# Patient Record
Sex: MALE | Race: WHITE | Employment: FULL TIME | URBAN - METROPOLITAN AREA
[De-identification: names, ages, dates, MRNs, and addresses within clinical notes are randomized per-mention and may not be internally consistent; named-entity substitution may affect disease eponyms.]

---

## 2017-10-31 ENCOUNTER — OFFICE VISIT (OUTPATIENT)
Dept: DERMATOLOGY | Age: 57
End: 2017-10-31

## 2017-10-31 DIAGNOSIS — D48.5 NEOPLASM OF UNCERTAIN BEHAVIOR OF SKIN: ICD-10-CM

## 2017-10-31 DIAGNOSIS — D18.00 ANGIOMA: ICD-10-CM

## 2017-10-31 DIAGNOSIS — D22.9 MULTIPLE NEVI: Primary | ICD-10-CM

## 2017-10-31 PROCEDURE — 11100 PR BIOPSY OF SKIN LESION: CPT | Performed by: DERMATOLOGY

## 2017-10-31 PROCEDURE — 99202 OFFICE O/P NEW SF 15 MIN: CPT | Performed by: DERMATOLOGY

## 2017-10-31 NOTE — PROGRESS NOTES
[facility]  Dorothy Cerda MD  [phone]      Rubia Vizcaino  1960    61 y.o. male     Date of Visit: 10/31/2017    Last seen: new; wife is a patient    Referred by: No ref. provider found    Chief Complaint: moles/lesions  Chief Complaint   Patient presents with    Skin Exam     NP-no history of skin cancer      History of Present Illness:    1. Here for evaluation of multiple asx pigmented lesions on the trunk and extremities, present for many years; no change in size/shape/color of any lesions; no bleeding lesions. 2. He has a concerning lesion on the R side of the neck that has been persistent x 3 mos. Asx currently but was slightly tender. Denies personal history of skin cancer. No family history of skin cancer. He avoids the sun, wears sunscreen if outside. No tanning bed use. Doesn't tan easily. Had a lot of sun as a child. Review of Systems:  Gen: Feels well, good sense of health. Skin: No changing moles or lesions. No new rashes. Past Medical History, Family History, Surgical History, Medications and Allergies reviewed. No outpatient prescriptions have been marked as taking for the 10/31/17 encounter (Office Visit) with Maylin Valera MD.     No Known Allergies    History reviewed. No pertinent past medical history. History reviewed. No pertinent surgical history. Physical Examination     Gen, well-appearing  The following were examined and determined to be normal: Psych/Neuro, Scalp/hair, Head/face, Conjunctivae/eyelids, Gums/teeth/lips, Breast/axilla/chest, Abdomen, Back, RUE, LUE, RLE, LLE, Nails/digits and buttocks. The following were examined and determined to be abnormal: Neck. trunk and extremities with scattered brown and red macules and papules   L chest with purplish red papule  R neck with shiny pink scabbed ~4 mm macule    Assessment and Plan     1.  Benign-appearing nevi and angiomas  - educ re ABCD's of MM   educ sun

## 2017-10-31 NOTE — PATIENT INSTRUCTIONS
you will do instead of smoking. Tell your family and friends that you are going to try to quit and on what date. Put together a list of phone numbers of friends and family members who can give you support when you feel you might break down and have a cigarette. Before your quit day, put all tobacco products, ashtrays, and lighters away. 3. Put your plan into action  When you wake up on your quit day, start using a nicotine replacement method if you had planned to do that. For the first few days after you quit, you may have some signs of nicotine withdrawal. You may feel restless and cranky. You may find it hard to think. You might need to change your dose of nicotine if these signs upset you. Do not smoke! If you feel like you want to smoke, call a friend or family member who has agreed to help you. Also, there might be someone in your doctor's office you can call. Put into action your plans for doing things other than smoking. For example, when you feel the urge to smoke, you might take a walk. Or, you might visit friends who do not smoke. It is best to stay away from places where you used to smoke. 4. If you return to smoking  Quitting smoking is not easy. Many people have to try several times before they succeed. If you start smoking again, call your primary care doctor's office soon to talk about what happened. Think about what you can do to keep from smoking when you try to quit again. Part of this handout is provided by the [employer]. More information is available at the American Lung Association https://moore.com/.  This information provides a general overview and may not apply to everyone. Talk to your primary care doctor to find out if this information applies to you and to get more information on this subject. Biopsy Wound Care Instructions    · Keep the bandage in place for 24 hours.    · Cleanse the wound with mild soapy water

## 2017-11-07 ENCOUNTER — TELEPHONE (OUTPATIENT)
Dept: DERMATOLOGY | Age: 57
End: 2017-11-07

## 2017-11-07 NOTE — TELEPHONE ENCOUNTER
Pt wife melchor calling to get her  biopsy results pls call back to discuss @ [phone] thank you I tried to attach to first message

## 2018-02-20 ENCOUNTER — OFFICE VISIT (OUTPATIENT)
Dept: INTERNAL MEDICINE CLINIC | Age: 58
End: 2018-02-20

## 2018-02-20 VITALS
DIASTOLIC BLOOD PRESSURE: 70 MMHG | SYSTOLIC BLOOD PRESSURE: 130 MMHG | HEIGHT: 70 IN | TEMPERATURE: 98.6 F | HEART RATE: 102 BPM | WEIGHT: 184.6 LBS | BODY MASS INDEX: 26.43 KG/M2

## 2018-02-20 DIAGNOSIS — Z76.89 ENCOUNTER TO ESTABLISH CARE: ICD-10-CM

## 2018-02-20 DIAGNOSIS — Z72.0 TOBACCO ABUSE: Primary | ICD-10-CM

## 2018-02-20 PROCEDURE — 99203 OFFICE O/P NEW LOW 30 MIN: CPT | Performed by: INTERNAL MEDICINE

## 2018-02-20 RX ORDER — VARENICLINE TARTRATE 0.5 MG/1
0.5 TABLET, FILM COATED ORAL 2 TIMES DAILY
COMMUNITY
End: 2020-01-08 | Stop reason: SINTOL

## 2018-02-20 ASSESSMENT — PATIENT HEALTH QUESTIONNAIRE - PHQ9
SUM OF ALL RESPONSES TO PHQ QUESTIONS 1-9: 0
2. FEELING DOWN, DEPRESSED OR HOPELESS: 0
1. LITTLE INTEREST OR PLEASURE IN DOING THINGS: 0
SUM OF ALL RESPONSES TO PHQ9 QUESTIONS 1 & 2: 0

## 2018-02-20 NOTE — PATIENT INSTRUCTIONS
live longer.)  What are the risks of screening? CT screening for lung cancer isn't perfect. It can show an abnormal result when it turns out there was not any cancer. This is called a false-positive result. This means you may need more tests to make sure you don't have cancer. These tests can be harmful and cause a lot of worry. These tests may include more CT scans and invasive testing like a lung biopsy. In a biopsy, the doctor takes a sample of tissue from inside your lung so it can be looked at under a microscope. A biopsy is the only way to tell if you have lung cancer. If the biopsy finds cancer, you and your doctor will have to decide how or whether to treat it. Some lung cancers found on CT scans are harmless and would not have caused a problem if they had not been found through screening. But because doctors can't tell which ones will turn out to be harmless, most will be treated. This means that you may get treatment-including surgery, radiation, or chemotherapy-that you don't need. There is a small chance of getting cancer from being exposed to radiation. A low-dose CT scan uses more radiation than a regular chest X-ray. But it uses much less than a regular-dose CT scan. You and your doctor will decide if the possibility of finding lung cancer early is worth the risk of having this test and being exposed to the radiation. What are the benefits of screening? Your scan may be normal (negative). For some people who are at higher risk, screening lowers the chance of dying of lung cancer. How much and how long you smoked helps to determine your risk level. Screening can find some cancers early, when treatment may be more likely to work. What happens after screening? The results of your CT scan will be sent to your doctor. Someone from your care team will explain the results of your scan and answer any questions you may have.  If you need any follow-up, he or she will help you understand what to do

## 2018-02-20 NOTE — PROGRESS NOTES
Subjective:      Patient ID: Daisy Cruz is a 61 y.o. male. HPI  Here to become established   He has no chronic medical issues. He has a history of tobacco abuse. He has successfully quit smoking in December 2017 with the aid of Chantix. He still takes this on occasion. He has no other medical issues or concerns    Patient's current med list, family, social history and past medical history reviewed and updated to  Health maintenance reviewed and appropriate screenings, immunizations discussed     Prior to Visit Medications    Medication Sig Taking? Authorizing Provider   varenicline (CHANTIX) 0.5 MG tablet Take 0.5 mg by mouth 2 times daily Yes Historical Provider, MD       Review of Systems    Objective:   Physical Exam   Psychiatric: He has a normal mood and affect. His behavior is normal. Thought content normal.       Assessment:         1. Tobacco abuse     2.  Encounter to establish care              Plan:       will need to get records  He reports he has had screening colonoscopy will need record  He has had tdap   Declined flu shot today  He plans to schedule physical  With 40 pack year history we also discussed lung cancer screening with low dose CT  He was given information regarding this  To discuss further at annual physical

## 2018-03-20 ENCOUNTER — OFFICE VISIT (OUTPATIENT)
Dept: DERMATOLOGY | Age: 58
End: 2018-03-20

## 2018-03-20 DIAGNOSIS — L57.0 AK (ACTINIC KERATOSIS): Primary | ICD-10-CM

## 2018-03-20 PROCEDURE — 99212 OFFICE O/P EST SF 10 MIN: CPT | Performed by: DERMATOLOGY

## 2019-12-14 ENCOUNTER — APPOINTMENT (OUTPATIENT)
Dept: CT IMAGING | Age: 59
End: 2019-12-14
Payer: COMMERCIAL

## 2019-12-14 ENCOUNTER — HOSPITAL ENCOUNTER (EMERGENCY)
Age: 59
Discharge: HOME | End: 2019-12-14
Attending: EMERGENCY MEDICINE
Payer: COMMERCIAL

## 2019-12-14 VITALS
WEIGHT: 184.08 LBS | OXYGEN SATURATION: 99 % | DIASTOLIC BLOOD PRESSURE: 61 MMHG | TEMPERATURE: 98.2 F | SYSTOLIC BLOOD PRESSURE: 115 MMHG | HEART RATE: 61 BPM | RESPIRATION RATE: 16 BRPM | BODY MASS INDEX: 26.41 KG/M2

## 2019-12-14 DIAGNOSIS — M25.78 CERVICAL OSTEOPHYTE: Primary | ICD-10-CM

## 2019-12-14 DIAGNOSIS — M99.79 NARROWING OF INTERVERTEBRAL DISC SPACE: ICD-10-CM

## 2019-12-14 LAB
ANION GAP SERPL CALCULATED.3IONS-SCNC: 13 MMOL/L (ref 3–16)
BASOPHILS ABSOLUTE: 0.1 K/UL (ref 0–0.2)
BASOPHILS RELATIVE PERCENT: 0.9 %
BUN BLDV-MCNC: 14 MG/DL (ref 7–20)
CALCIUM SERPL-MCNC: 8.9 MG/DL (ref 8.3–10.6)
CHLORIDE BLD-SCNC: 107 MMOL/L (ref 99–110)
CO2: 19 MMOL/L (ref 21–32)
CREAT SERPL-MCNC: 0.7 MG/DL (ref 0.8–1.3)
EOSINOPHILS ABSOLUTE: 0.4 K/UL (ref 0–0.6)
EOSINOPHILS RELATIVE PERCENT: 4.3 %
GFR AFRICAN AMERICAN: >60
GFR NON-AFRICAN AMERICAN: >60
GLUCOSE BLD-MCNC: 103 MG/DL (ref 70–99)
HCT VFR BLD CALC: 48.2 % (ref 40.5–52.5)
HEMOGLOBIN: 16.3 G/DL (ref 13.5–17.5)
LYMPHOCYTES ABSOLUTE: 3.2 K/UL (ref 1–5.1)
LYMPHOCYTES RELATIVE PERCENT: 33.7 %
MCH RBC QN AUTO: 29.9 PG (ref 26–34)
MCHC RBC AUTO-ENTMCNC: 33.8 G/DL (ref 31–36)
MCV RBC AUTO: 88.3 FL (ref 80–100)
MONOCYTES ABSOLUTE: 0.6 K/UL (ref 0–1.3)
MONOCYTES RELATIVE PERCENT: 6.1 %
NEUTROPHILS ABSOLUTE: 5.2 K/UL (ref 1.7–7.7)
NEUTROPHILS RELATIVE PERCENT: 55 %
PDW BLD-RTO: 14.6 % (ref 12.4–15.4)
PLATELET # BLD: 248 K/UL (ref 135–450)
PMV BLD AUTO: 6.9 FL (ref 5–10.5)
POTASSIUM REFLEX MAGNESIUM: 4.4 MMOL/L (ref 3.5–5.1)
RBC # BLD: 5.47 M/UL (ref 4.2–5.9)
SODIUM BLD-SCNC: 139 MMOL/L (ref 136–145)
TROPONIN: <0.01 NG/ML
WBC # BLD: 9.5 K/UL (ref 4–11)

## 2019-12-14 PROCEDURE — 84484 ASSAY OF TROPONIN QUANT: CPT

## 2019-12-14 PROCEDURE — 80048 BASIC METABOLIC PNL TOTAL CA: CPT

## 2019-12-14 PROCEDURE — 99284 EMERGENCY DEPT VISIT MOD MDM: CPT

## 2019-12-14 PROCEDURE — 85025 COMPLETE CBC W/AUTO DIFF WBC: CPT

## 2019-12-14 PROCEDURE — 93005 ELECTROCARDIOGRAM TRACING: CPT | Performed by: EMERGENCY MEDICINE

## 2019-12-14 PROCEDURE — 6370000000 HC RX 637 (ALT 250 FOR IP): Performed by: EMERGENCY MEDICINE

## 2019-12-14 PROCEDURE — 72128 CT CHEST SPINE W/O DYE: CPT

## 2019-12-14 PROCEDURE — 72125 CT NECK SPINE W/O DYE: CPT

## 2019-12-14 PROCEDURE — 71275 CT ANGIOGRAPHY CHEST: CPT

## 2019-12-14 PROCEDURE — 6360000004 HC RX CONTRAST MEDICATION: Performed by: EMERGENCY MEDICINE

## 2019-12-14 PROCEDURE — 36415 COLL VENOUS BLD VENIPUNCTURE: CPT

## 2019-12-14 RX ORDER — LIDOCAINE 50 MG/G
1 PATCH TOPICAL DAILY
Qty: 10 PATCH | Refills: 0 | Status: SHIPPED | OUTPATIENT
Start: 2019-12-14 | End: 2019-12-24

## 2019-12-14 RX ORDER — CYCLOBENZAPRINE HCL 10 MG
10 TABLET ORAL ONCE
Status: COMPLETED | OUTPATIENT
Start: 2019-12-14 | End: 2019-12-14

## 2019-12-14 RX ORDER — LIDOCAINE 4 G/G
1 PATCH TOPICAL ONCE
Status: DISCONTINUED | OUTPATIENT
Start: 2019-12-14 | End: 2019-12-14 | Stop reason: HOSPADM

## 2019-12-14 RX ORDER — CYCLOBENZAPRINE HCL 5 MG
5 TABLET ORAL 2 TIMES DAILY PRN
Qty: 15 TABLET | Refills: 0 | Status: SHIPPED | OUTPATIENT
Start: 2019-12-14 | End: 2019-12-24

## 2019-12-14 RX ORDER — HYDROCODONE BITARTRATE AND ACETAMINOPHEN 5; 325 MG/1; MG/1
1 TABLET ORAL EVERY 6 HOURS PRN
Qty: 15 TABLET | Refills: 0 | Status: SHIPPED | OUTPATIENT
Start: 2019-12-14 | End: 2019-12-21

## 2019-12-14 RX ORDER — HYDROCODONE BITARTRATE AND ACETAMINOPHEN 5; 325 MG/1; MG/1
2 TABLET ORAL ONCE
Status: COMPLETED | OUTPATIENT
Start: 2019-12-14 | End: 2019-12-14

## 2019-12-14 RX ORDER — METHYLPREDNISOLONE 4 MG/1
TABLET ORAL
Qty: 1 KIT | Refills: 0 | Status: SHIPPED | OUTPATIENT
Start: 2019-12-14 | End: 2020-01-08 | Stop reason: ALTCHOICE

## 2019-12-14 RX ADMIN — HYDROCODONE BITARTRATE AND ACETAMINOPHEN 2 TABLET: 5; 325 TABLET ORAL at 11:33

## 2019-12-14 RX ADMIN — IOPAMIDOL 75 ML: 755 INJECTION, SOLUTION INTRAVENOUS at 13:16

## 2019-12-14 RX ADMIN — CYCLOBENZAPRINE HYDROCHLORIDE 10 MG: 10 TABLET, FILM COATED ORAL at 11:33

## 2019-12-14 ASSESSMENT — PAIN - FUNCTIONAL ASSESSMENT: PAIN_FUNCTIONAL_ASSESSMENT: PREVENTS OR INTERFERES WITH MANY ACTIVE NOT PASSIVE ACTIVITIES

## 2019-12-14 ASSESSMENT — ENCOUNTER SYMPTOMS
SHORTNESS OF BREATH: 0
EYE PAIN: 0
VOMITING: 0
COUGH: 0
NAUSEA: 0
CHEST TIGHTNESS: 0
BLOOD IN STOOL: 0
EYE DISCHARGE: 0
EYE ITCHING: 0
WHEEZING: 0
EYE REDNESS: 0
CHOKING: 0
BACK PAIN: 1
ANAL BLEEDING: 0
APNEA: 0
STRIDOR: 0
ABDOMINAL DISTENTION: 0
ABDOMINAL PAIN: 0
DIARRHEA: 0
CONSTIPATION: 0
PHOTOPHOBIA: 0
COLOR CHANGE: 0
RECTAL PAIN: 0

## 2019-12-14 ASSESSMENT — PAIN DESCRIPTION - FREQUENCY: FREQUENCY: CONTINUOUS

## 2019-12-14 ASSESSMENT — PAIN SCALES - GENERAL
PAINLEVEL_OUTOF10: 0
PAINLEVEL_OUTOF10: 7
PAINLEVEL_OUTOF10: 4

## 2019-12-14 ASSESSMENT — PAIN DESCRIPTION - PROGRESSION: CLINICAL_PROGRESSION: GRADUALLY WORSENING

## 2019-12-14 ASSESSMENT — PAIN DESCRIPTION - ONSET: ONSET: ON-GOING

## 2019-12-14 ASSESSMENT — PAIN DESCRIPTION - LOCATION: LOCATION: NECK

## 2019-12-14 ASSESSMENT — PAIN DESCRIPTION - DESCRIPTORS: DESCRIPTORS: ACHING

## 2019-12-14 ASSESSMENT — PAIN DESCRIPTION - PAIN TYPE: TYPE: ACUTE PAIN

## 2019-12-14 ASSESSMENT — PAIN DESCRIPTION - ORIENTATION: ORIENTATION: LEFT;RIGHT

## 2019-12-16 LAB
EKG ATRIAL RATE: 66 BPM
EKG DIAGNOSIS: NORMAL
EKG P AXIS: 74 DEGREES
EKG P-R INTERVAL: 196 MS
EKG Q-T INTERVAL: 394 MS
EKG QRS DURATION: 86 MS
EKG QTC CALCULATION (BAZETT): 413 MS
EKG R AXIS: 0 DEGREES
EKG T AXIS: 51 DEGREES
EKG VENTRICULAR RATE: 66 BPM

## 2019-12-16 PROCEDURE — 93010 ELECTROCARDIOGRAM REPORT: CPT | Performed by: INTERNAL MEDICINE

## 2020-01-08 ENCOUNTER — HOSPITAL ENCOUNTER (OUTPATIENT)
Dept: MRI IMAGING | Age: 60
Discharge: HOME | End: 2020-01-08
Payer: COMMERCIAL

## 2020-01-08 ENCOUNTER — ANESTHESIA EVENT (OUTPATIENT)
Dept: MRI IMAGING | Age: 60
End: 2020-01-08

## 2020-01-08 ENCOUNTER — ANESTHESIA (OUTPATIENT)
Dept: MRI IMAGING | Age: 60
End: 2020-01-08

## 2020-01-08 VITALS — SYSTOLIC BLOOD PRESSURE: 102 MMHG | OXYGEN SATURATION: 98 % | DIASTOLIC BLOOD PRESSURE: 65 MMHG | TEMPERATURE: 96.8 F

## 2020-01-08 VITALS
WEIGHT: 178 LBS | HEIGHT: 70 IN | BODY MASS INDEX: 25.48 KG/M2 | TEMPERATURE: 97.8 F | OXYGEN SATURATION: 98 % | RESPIRATION RATE: 18 BRPM | HEART RATE: 78 BPM | DIASTOLIC BLOOD PRESSURE: 76 MMHG | SYSTOLIC BLOOD PRESSURE: 132 MMHG

## 2020-01-08 PROCEDURE — 3700000000 HC ANESTHESIA ATTENDED CARE

## 2020-01-08 PROCEDURE — 7100000001 HC PACU RECOVERY - ADDTL 15 MIN

## 2020-01-08 PROCEDURE — 2580000003 HC RX 258: Performed by: NURSE ANESTHETIST, CERTIFIED REGISTERED

## 2020-01-08 PROCEDURE — 7100000000 HC PACU RECOVERY - FIRST 15 MIN

## 2020-01-08 PROCEDURE — 7100000010 HC PHASE II RECOVERY - FIRST 15 MIN

## 2020-01-08 PROCEDURE — 7100000011 HC PHASE II RECOVERY - ADDTL 15 MIN

## 2020-01-08 PROCEDURE — 6360000002 HC RX W HCPCS: Performed by: NURSE ANESTHETIST, CERTIFIED REGISTERED

## 2020-01-08 PROCEDURE — 3700000001 HC ADD 15 MINUTES (ANESTHESIA)

## 2020-01-08 PROCEDURE — 2500000003 HC RX 250 WO HCPCS: Performed by: NURSE ANESTHETIST, CERTIFIED REGISTERED

## 2020-01-08 PROCEDURE — 72141 MRI NECK SPINE W/O DYE: CPT

## 2020-01-08 RX ORDER — LIDOCAINE HYDROCHLORIDE 20 MG/ML
INJECTION, SOLUTION INFILTRATION; PERINEURAL PRN
Status: DISCONTINUED | OUTPATIENT
Start: 2020-01-08 | End: 2020-01-08 | Stop reason: SDUPTHER

## 2020-01-08 RX ORDER — PROPOFOL 10 MG/ML
INJECTION, EMULSION INTRAVENOUS PRN
Status: DISCONTINUED | OUTPATIENT
Start: 2020-01-08 | End: 2020-01-08 | Stop reason: SDUPTHER

## 2020-01-08 RX ORDER — HYDRALAZINE HYDROCHLORIDE 20 MG/ML
5 INJECTION INTRAMUSCULAR; INTRAVENOUS EVERY 5 MIN PRN
Status: DISCONTINUED | OUTPATIENT
Start: 2020-01-08 | End: 2020-01-09 | Stop reason: HOSPADM

## 2020-01-08 RX ORDER — SODIUM CHLORIDE 0.9 % (FLUSH) 0.9 %
10 SYRINGE (ML) INJECTION PRN
Status: DISCONTINUED | OUTPATIENT
Start: 2020-01-08 | End: 2020-01-09 | Stop reason: HOSPADM

## 2020-01-08 RX ORDER — MIDAZOLAM HYDROCHLORIDE 1 MG/ML
INJECTION INTRAMUSCULAR; INTRAVENOUS
Status: DISCONTINUED
Start: 2020-01-08 | End: 2020-01-09 | Stop reason: HOSPADM

## 2020-01-08 RX ORDER — ENALAPRILAT 2.5 MG/2ML
1.25 INJECTION INTRAVENOUS
Status: CANCELLED | OUTPATIENT
Start: 2020-01-08 | End: 2020-01-08

## 2020-01-08 RX ORDER — SODIUM CHLORIDE, SODIUM LACTATE, POTASSIUM CHLORIDE, CALCIUM CHLORIDE 600; 310; 30; 20 MG/100ML; MG/100ML; MG/100ML; MG/100ML
INJECTION, SOLUTION INTRAVENOUS CONTINUOUS
Status: DISCONTINUED | OUTPATIENT
Start: 2020-01-08 | End: 2020-01-09 | Stop reason: HOSPADM

## 2020-01-08 RX ORDER — FENTANYL CITRATE 50 UG/ML
INJECTION, SOLUTION INTRAMUSCULAR; INTRAVENOUS
Status: DISCONTINUED
Start: 2020-01-08 | End: 2020-01-09 | Stop reason: HOSPADM

## 2020-01-08 RX ORDER — SODIUM CHLORIDE 9 MG/ML
INJECTION, SOLUTION INTRAVENOUS CONTINUOUS PRN
Status: DISCONTINUED | OUTPATIENT
Start: 2020-01-08 | End: 2020-01-08 | Stop reason: SDUPTHER

## 2020-01-08 RX ORDER — LABETALOL 20 MG/4 ML (5 MG/ML) INTRAVENOUS SYRINGE
5 EVERY 10 MIN PRN
Status: CANCELLED | OUTPATIENT
Start: 2020-01-08

## 2020-01-08 RX ORDER — ENALAPRILAT 2.5 MG/2ML
1.25 INJECTION INTRAVENOUS
Status: ACTIVE | OUTPATIENT
Start: 2020-01-08 | End: 2020-01-08

## 2020-01-08 RX ORDER — FENTANYL CITRATE 50 UG/ML
25 INJECTION, SOLUTION INTRAMUSCULAR; INTRAVENOUS EVERY 5 MIN PRN
Status: CANCELLED | OUTPATIENT
Start: 2020-01-08

## 2020-01-08 RX ORDER — HYDRALAZINE HYDROCHLORIDE 20 MG/ML
5 INJECTION INTRAMUSCULAR; INTRAVENOUS EVERY 5 MIN PRN
Status: CANCELLED | OUTPATIENT
Start: 2020-01-08

## 2020-01-08 RX ORDER — FENTANYL CITRATE 50 UG/ML
50 INJECTION, SOLUTION INTRAMUSCULAR; INTRAVENOUS EVERY 5 MIN PRN
Status: CANCELLED | OUTPATIENT
Start: 2020-01-08

## 2020-01-08 RX ORDER — GABAPENTIN 300 MG/1
300 CAPSULE ORAL 3 TIMES DAILY
COMMUNITY
End: 2022-08-01

## 2020-01-08 RX ORDER — FENTANYL CITRATE 50 UG/ML
50 INJECTION, SOLUTION INTRAMUSCULAR; INTRAVENOUS EVERY 5 MIN PRN
Status: DISCONTINUED | OUTPATIENT
Start: 2020-01-08 | End: 2020-01-09 | Stop reason: HOSPADM

## 2020-01-08 RX ORDER — ONDANSETRON 2 MG/ML
4 INJECTION INTRAMUSCULAR; INTRAVENOUS
Status: ACTIVE | OUTPATIENT
Start: 2020-01-08 | End: 2020-01-08

## 2020-01-08 RX ORDER — GLYCOPYRROLATE 0.2 MG/ML
0.2 INJECTION INTRAMUSCULAR; INTRAVENOUS ONCE
Status: DISCONTINUED | OUTPATIENT
Start: 2020-01-08 | End: 2020-01-09 | Stop reason: HOSPADM

## 2020-01-08 RX ORDER — SODIUM CHLORIDE 0.9 % (FLUSH) 0.9 %
10 SYRINGE (ML) INJECTION EVERY 12 HOURS SCHEDULED
Status: DISCONTINUED | OUTPATIENT
Start: 2020-01-08 | End: 2020-01-09 | Stop reason: HOSPADM

## 2020-01-08 RX ORDER — IBUPROFEN 200 MG
400 TABLET ORAL EVERY 6 HOURS PRN
COMMUNITY

## 2020-01-08 RX ORDER — LABETALOL 20 MG/4 ML (5 MG/ML) INTRAVENOUS SYRINGE
5 EVERY 10 MIN PRN
Status: DISCONTINUED | OUTPATIENT
Start: 2020-01-08 | End: 2020-01-09 | Stop reason: HOSPADM

## 2020-01-08 RX ORDER — DEXAMETHASONE SODIUM PHOSPHATE 4 MG/ML
4 INJECTION, SOLUTION INTRA-ARTICULAR; INTRALESIONAL; INTRAMUSCULAR; INTRAVENOUS; SOFT TISSUE ONCE
Status: DISCONTINUED | OUTPATIENT
Start: 2020-01-08 | End: 2020-01-09 | Stop reason: HOSPADM

## 2020-01-08 RX ORDER — LIDOCAINE HYDROCHLORIDE 10 MG/ML
1 INJECTION, SOLUTION EPIDURAL; INFILTRATION; INTRACAUDAL; PERINEURAL
Status: ACTIVE | OUTPATIENT
Start: 2020-01-08 | End: 2020-01-08

## 2020-01-08 RX ORDER — ONDANSETRON 2 MG/ML
4 INJECTION INTRAMUSCULAR; INTRAVENOUS
Status: CANCELLED | OUTPATIENT
Start: 2020-01-08 | End: 2020-01-08

## 2020-01-08 RX ORDER — ONDANSETRON 2 MG/ML
4 INJECTION INTRAMUSCULAR; INTRAVENOUS ONCE
Status: DISCONTINUED | OUTPATIENT
Start: 2020-01-08 | End: 2020-01-09 | Stop reason: HOSPADM

## 2020-01-08 RX ORDER — FENTANYL CITRATE 50 UG/ML
25 INJECTION, SOLUTION INTRAMUSCULAR; INTRAVENOUS EVERY 5 MIN PRN
Status: DISCONTINUED | OUTPATIENT
Start: 2020-01-08 | End: 2020-01-09 | Stop reason: HOSPADM

## 2020-01-08 RX ADMIN — PROPOFOL 400 MG: 10 INJECTION, EMULSION INTRAVENOUS at 14:14

## 2020-01-08 RX ADMIN — SODIUM CHLORIDE: 9 INJECTION, SOLUTION INTRAVENOUS at 14:10

## 2020-01-08 RX ADMIN — LIDOCAINE HYDROCHLORIDE 60 MG: 20 INJECTION, SOLUTION INFILTRATION; PERINEURAL at 14:14

## 2020-01-08 ASSESSMENT — PAIN SCALES - GENERAL
PAINLEVEL_OUTOF10: 2

## 2020-01-08 ASSESSMENT — PAIN DESCRIPTION - LOCATION: LOCATION: SHOULDER;NECK

## 2020-01-08 ASSESSMENT — PAIN DESCRIPTION - ORIENTATION: ORIENTATION: LEFT

## 2020-01-08 ASSESSMENT — PAIN DESCRIPTION - PAIN TYPE: TYPE: CHRONIC PAIN

## 2020-01-08 ASSESSMENT — PAIN - FUNCTIONAL ASSESSMENT: PAIN_FUNCTIONAL_ASSESSMENT: 0-10

## 2020-01-08 ASSESSMENT — PAIN DESCRIPTION - DESCRIPTORS: DESCRIPTORS: BURNING

## 2020-01-08 ASSESSMENT — PAIN DESCRIPTION - FREQUENCY: FREQUENCY: INTERMITTENT

## 2020-01-08 NOTE — PROGRESS NOTES
Ambulatory Surgery/Procedure Discharge Note    Vitals:    01/08/20 1543   BP: 132/76   Pulse: 78   Resp: 18   Temp: 97.8 °F (36.6 °C)   SpO2: 98%     1541  In: 800 [I.V.:800]  Out: -     Restroom use offered before discharge. Yes    Pain assessment:  {Pain assessment:  Pain Level: 2    Arrived in SDS alert and oriented. Taking ice chips. Assisted with dressing. Stable when up. Discharge instructions reviewed. Patient and wife educated, using the teach back method, about follow up instructions and discharge instructions. A completed copy of the AVS instructions given to patient and all questions answered. Patient discharged to home/self care.  Patient discharged via wheel chair by me to waiting family/S.O.       1/8/2020 4:57 PM

## 2020-01-08 NOTE — ANESTHESIA PRE PROCEDURE
Surgical History:        Procedure Laterality Date    CYST REMOVAL Right     deltoid       Social History:    Social History     Tobacco Use    Smoking status: Former Smoker     Packs/day: 1.00     Years: 40.00     Pack years: 40.00     Last attempt to quit: 2017     Years since quittin.0    Smokeless tobacco: Never Used   Substance Use Topics    Alcohol use: Yes     Comment: 2                                Counseling given: Not Answered      Vital Signs (Current):   Vitals:    20 1225   BP: 121/87   Pulse: 106   Resp: 18   Temp: 97.6 °F (36.4 °C)   TempSrc: Oral   SpO2: 93%   Weight: 178 lb (80.7 kg)   Height: 5' 9.75\" (1.772 m)                                              BP Readings from Last 3 Encounters:   20 121/87   19 115/61   18 130/70       NPO Status: Time of last liquid consumption:                         Time of last solid consumption:                         Date of last liquid consumption: 20                        Date of last solid food consumption: 20    BMI:   Wt Readings from Last 3 Encounters:   20 178 lb (80.7 kg)   19 184 lb 1.4 oz (83.5 kg)   18 184 lb 9.6 oz (83.7 kg)     Body mass index is 25.72 kg/m².     CBC:   Lab Results   Component Value Date    WBC 9.5 2019    RBC 5.47 2019    HGB 16.3 2019    HCT 48.2 2019    MCV 88.3 2019    RDW 14.6 2019     2019       CMP:   Lab Results   Component Value Date     2019    K 4.4 2019     2019    CO2 19 2019    BUN 14 2019    CREATININE 0.7 2019    GFRAA >60 2019    GFRAA >60 2010    AGRATIO 1.9 2010    LABGLOM >60 2019    GLUCOSE 103 2019    PROT 6.8 2011    CALCIUM 8.9 2019    BILITOT 0.40 2011    ALKPHOS 84 2011    AST 27 2011    ALT 34 2011       POC Tests: No results for input(s): POCGLU, POCNA, POCK, POCCL,

## 2020-01-08 NOTE — PROGRESS NOTES
PACU Transfer to [facility]    Vitals:    01/08/20 1530   BP: 97/79   Pulse: 78   Resp: 16   Temp: 97.9 °F (36.6 °C)   SpO2: 98%         Intake/Output Summary (Last 24 hours) at 1/8/2020 1537  Last data filed at 1/8/2020 1505  Gross per 24 hour   Intake 800 ml   Output --   Net 800 ml       Pain assessment:   Pain Level: 2(tolerable)    Patient transferred to care of [facility] RN.    1/8/2020 3:37 PM

## 2020-03-12 ENCOUNTER — OFFICE VISIT (OUTPATIENT)
Dept: DERMATOLOGY | Age: 60
End: 2020-03-12
Payer: COMMERCIAL

## 2020-03-12 PROCEDURE — 99213 OFFICE O/P EST LOW 20 MIN: CPT | Performed by: DERMATOLOGY

## 2020-03-12 RX ORDER — VARENICLINE TARTRATE
KIT
COMMUNITY
Start: 2020-03-11 | End: 2022-08-01

## 2020-03-12 RX ORDER — MELOXICAM 15 MG/1
TABLET ORAL
COMMUNITY
Start: 2020-03-09 | End: 2022-08-01

## 2020-03-12 NOTE — PROGRESS NOTES
[facility]  Jamie Austin MD  [facility]  1960    60 y.o. male     Date of Visit: 3/12/2020    Last seen: 3-2018; wife is a patient    Chief Complaint: f/u AK  Chief Complaint   Patient presents with    Skin Exam     History of Present Illness:  *retiring this fall - will help out with grandchildren - another due this summer 2020    1. Here for f/u s/p bx of tender irritated lesion on the R neck at last visit  - read as AK. Healed well and has had no probs since last seen. No other new concerns noted. 2. Here for evaluation of multiple asx pigmented lesions on the trunk and extremities, present for many years; no change in size/shape/color of any lesions; no bleeding lesions. Denies personal history of skin cancer. No family history of skin cancer. He avoids the sun, wears sunscreen if outside. No tanning bed use. Doesn't tan easily. Had a lot of sun as a child. Review of Systems:  Gen: Feels well, good sense of health. Skin: No changing moles or lesions. Past Medical History, Family History, Surgical History, Medications and Allergies reviewed.     Outpatient Medications Marked as Taking for the 3/12/20 encounter (Office Visit) with Rhoda Leija MD   Medication Sig Dispense Refill    meloxicam (MOBIC) 15 MG tablet       CHANTIX STARTING MONTH JOHN 0.5 MG X 11 & 1 MG X 42 tablet       ibuprofen (ADVIL;MOTRIN) 200 MG tablet Take 400 mg by mouth every 6 hours as needed for Pain       No Known Allergies    Past Medical History:   Diagnosis Date    Hyperlipidemia      Past Surgical History:   Procedure Laterality Date    CYST REMOVAL Right     deltoid       Physical Examination     Gen, well-appearing    The following were examined and determined to be normal: Psych/Neuro, Scalp/hair, Head/face, Conjunctivae/eyelids, Gums/teeth/lips, Neck, Breast/axilla/chest, Abdomen, Back, RUE, LUE, RLE, LLE, Nails/digits and Genitalia/groin/buttocks. The following were examined and determined to be abnormal: none    neck with subtle smooth barely visible scar from bx - no scale or erythema   Face clear  trunk and extremities with scattered brown macules and papules     Assessment and Plan     1. AK - now clear s/p bx  2.  Benign-appearing nevi and SK's  - educ re ABCD's of MM   educ sun protection   encouraged skin check yearly (sooner if indicated), self checks

## 2021-06-15 ENCOUNTER — OFFICE VISIT (OUTPATIENT)
Dept: DERMATOLOGY | Age: 61
End: 2021-06-15
Payer: COMMERCIAL

## 2021-06-15 VITALS — TEMPERATURE: 97.7 F

## 2021-06-15 DIAGNOSIS — D18.01 CHERRY ANGIOMA: ICD-10-CM

## 2021-06-15 DIAGNOSIS — L57.0 AK (ACTINIC KERATOSIS): Primary | ICD-10-CM

## 2021-06-15 DIAGNOSIS — D22.9 MULTIPLE NEVI: ICD-10-CM

## 2021-06-15 DIAGNOSIS — L82.1 SEBORRHEIC KERATOSIS: ICD-10-CM

## 2021-06-15 PROCEDURE — 17003 DESTRUCT PREMALG LES 2-14: CPT | Performed by: DERMATOLOGY

## 2021-06-15 PROCEDURE — 17000 DESTRUCT PREMALG LESION: CPT | Performed by: DERMATOLOGY

## 2021-06-15 PROCEDURE — 99213 OFFICE O/P EST LOW 20 MIN: CPT | Performed by: DERMATOLOGY

## 2021-06-15 NOTE — PROGRESS NOTES
[facility]  MD Vj Allen Jairo  1960    63 y.o. male     Date of Visit: 6/15/2021    Last seen: 3-2020; wife is a patient    Chief Complaint: f/u AK  Chief Complaint   Patient presents with    Skin Exam     History of Present Illness:  *retired fall 2020 - helping out with grandchildren - youngest born summer 2020    1. F/u AK's. New rough spots on the right forehead and left nasal tip. Asymptomatic.  s/p bx of tender irritated lesion on the R neck at last visit  - read as AK. Healed well and has had no probs since last seen. 2. Here for evaluation of multiple asx pigmented lesions on the trunk and extremities, present for many years; no change in size/shape/color of any lesions; no bleeding lesions. He has a few scattered red lesions as well. Denies personal history of skin cancer. No family history of skin cancer. He avoids the sun, wears sunscreen if outside. No tanning bed use. Doesn't tan easily. Had a lot of sun as a child. Review of Systems:  Gen: Feels well, good sense of health. Skin: No changing moles or lesions. Past Medical History, Family History, Surgical History, Medications and Allergies reviewed.     No outpatient medications have been marked as taking for the 6/15/21 encounter (Office Visit) with Gretchen Eddy MD.     No Known Allergies    Past Medical History:   Diagnosis Date    Hyperlipidemia      Past Surgical History:   Procedure Laterality Date    CYST REMOVAL Right     deltoid       Physical Examination     Gen, well-appearing    The following were examined and determined to be normal: Psych/Neuro, Scalp/hair, Head/face, Conjunctivae/eyelids, Gums/teeth/lips, Neck, Breast/axilla/chest, Abdomen, Back, RUE, LUE, RLE, LLE, Nails/digits and buttocks    The following were examined and determined to be abnormal: Face    neck clear  Right forehead and left nasal tip with small rough faintly pink macules  trunk and extremities with scattered brown macules and papules     Assessment and Plan     1. AK - L nasal tip and R FH - 2   - lesion(s) treated with liquid nitrogen x 2 cycles. Patient educated on risk of blister, hypopigmentation/scar and wound care.      2. Benign-appearing nevi and SK's and cherry angiomas  - educ re ABCD's of MM   educ sun protection SPF 30+  encouraged skin check yearly (sooner if indicated), self checks

## 2021-06-15 NOTE — PATIENT INSTRUCTIONS
lips! Remember to reapply sunscreen about every 2 hours and after swimming or sweating. · Wear sun protective clothing. Swim shirts (aka. rash guards) are a great idea and negates the need to reapply sunscreen in those areas.      · Seek the shade whenever possible especially between the hours of 10 am and 4 pm when the suns rays are the strongest.     · Avoid tanning beds  ·

## 2022-08-01 ENCOUNTER — OFFICE VISIT (OUTPATIENT)
Dept: DERMATOLOGY | Age: 62
End: 2022-08-01
Payer: COMMERCIAL

## 2022-08-01 DIAGNOSIS — L82.1 SEBORRHEIC KERATOSIS: ICD-10-CM

## 2022-08-01 DIAGNOSIS — L57.0 AK (ACTINIC KERATOSIS): Primary | ICD-10-CM

## 2022-08-01 DIAGNOSIS — D48.5 NEOPLASM OF UNCERTAIN BEHAVIOR OF SKIN: ICD-10-CM

## 2022-08-01 DIAGNOSIS — D22.9 MULTIPLE NEVI: ICD-10-CM

## 2022-08-01 PROCEDURE — 99213 OFFICE O/P EST LOW 20 MIN: CPT | Performed by: DERMATOLOGY

## 2022-08-01 PROCEDURE — 11102 TANGNTL BX SKIN SINGLE LES: CPT | Performed by: DERMATOLOGY

## 2022-08-01 NOTE — PROGRESS NOTES
[facility]  Tania Wise MD  [phone]      Aristides Osborne  1960    57 y.o. male     Date of Visit: 8/1/2022    Last seen: 6-2021; wife is a patient    Chief Complaint: f/u AK  Chief Complaint   Patient presents with    Skin Exam     FSE      HX:AK     History of Present Illness:  *retired fall 2020 - helping out with grandchildren - youngest born summer 2020    1. F/u AK's. No new spots or probs with previous sites. s/p bx of tender irritated lesion on the R neck at last visit  - read as AK. Healed well and has had no probs since last seen. 2. Here for evaluation of multiple asx pigmented lesions on the trunk and extremities, present for many years; no change in size/shape/color of any lesions; no bleeding lesions. He has a few scattered red lesions as well. 3. He has a new concerning brown lesion on the  R calf. Asx. Denies personal history of skin cancer. No family history of skin cancer. He avoids the sun, wears sunscreen if outside. No tanning bed use. Doesn't tan easily. Had a lot of sun as a child. Review of Systems:  Gen: Feels well, good sense of health. Skin: No changing moles or lesions. Past Medical History, Family History, Surgical History, Medications and Allergies reviewed. Outpatient Medications Marked as Taking for the 8/1/22 encounter (Office Visit) with Mikala Nguyen MD   Medication Sig Dispense Refill    ibuprofen (ADVIL;MOTRIN) 200 MG tablet Take 400 mg by mouth every 6 hours as needed for Pain       No Known Allergies    Past Medical History:   Diagnosis Date    Hyperlipidemia      Past Surgical History:   Procedure Laterality Date    CYST REMOVAL Right     deltoid       Physical Examination     Gen, well-appearing    FSE today    neck clear  No AK's  trunk and extremities with scattered brown macules and papules   R calf with dull brown asymmetric macule    Assessment and Plan     1. AK - clear  2. Benign-appearing nevi and SK's and cherry angiomas  - Monitor for ABCD's of MM and si/sx of NMSC  Continue sun protection - OTC sunscreen with SPF 30-50+ recommended and reviewed usage  Encouraged skin check yearly (sooner if indicated), self checks    3. R/o lentigo/flat SK vs LM - R calf  - Shave biopsy performed after verbal consent obtained. Patient educated regarding risk of bleeding, infection, scar and educated on wound care. Skin cleansed with alcohol pad and site anesthetized with lido + epi. Aluminum chloride applied to site for hemostasis. Petrolatum ointment and bandage applied. Specimen bottle labeled with patient information and site and specimen sent to dermpath.

## 2022-08-03 LAB — DERMATOLOGY PATHOLOGY REPORT: ABNORMAL

## 2022-10-20 ENCOUNTER — OFFICE VISIT (OUTPATIENT)
Dept: DERMATOLOGY | Age: 62
End: 2022-10-20
Payer: COMMERCIAL

## 2022-10-20 DIAGNOSIS — D04.71 SQUAMOUS CELL CARCINOMA IN SITU (SCCIS) OF SKIN OF RIGHT LOWER LEG: Primary | ICD-10-CM

## 2022-10-20 PROCEDURE — 17262 DSTRJ MAL LES T/A/L 1.1-2.0: CPT | Performed by: DERMATOLOGY

## 2022-10-20 NOTE — PROGRESS NOTES
[facility]  Ami Quezada MD  [phone]      Charleen Maker  1960    60 y.o. male     Date of Visit: 10/20/2022    Last seen: 8-2022; wife is a patient    Chief Complaint: f/u AK  Chief Complaint   Patient presents with    Procedure     SCC in situ/curettage rt calf     History of Present Illness:  *retired fall 2020 - helping out with grandchildren - youngest born summer 2020    1. 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 Denies personal history of skin cancer. No family history of skin cancer. He avoids the sun, wears sunscreen if outside. No tanning bed use. Doesn't tan easily. Had a lot of sun as a child. Review of Systems:  Gen: Feels well, good sense of health. Skin: No changing moles or lesions. Past Medical History, Family History, Surgical History, Medications and Allergies reviewed. 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   Elected for curettage since small, superficial and nonfacial  Curretage today - 12 mm  Ed risk bleed, infxn, scar   Area anesthetized with lido with epi after prep with alcohol pad  Lesion treated with curettage x 3 directions with 2-3 mm margins   Hemostasis with aluminum chloride   Wound bandaged and pt educ re wnd care     F/u yearly for FSE, sooner prn concerns

## 2024-02-06 ENCOUNTER — OFFICE VISIT (OUTPATIENT)
Dept: DERMATOLOGY | Age: 64
End: 2024-02-06

## 2024-02-06 DIAGNOSIS — Z85.828 HISTORY OF NONMELANOMA SKIN CANCER: ICD-10-CM

## 2024-02-06 DIAGNOSIS — L57.0 AK (ACTINIC KERATOSIS): Primary | ICD-10-CM

## 2024-02-06 DIAGNOSIS — L82.1 SEBORRHEIC KERATOSIS: ICD-10-CM

## 2024-02-06 DIAGNOSIS — D22.9 MULTIPLE NEVI: ICD-10-CM

## 2024-02-06 NOTE — PROGRESS NOTES
[facility]  Ivett Feliciano MD  [phone]      Jaylon Motta  1960    63 y.o. male     Date of Visit: 2/6/2024    Last seen: 6-2021; wife is a patient    Chief Complaint: moles/lesions, f/u AK  Chief Complaint   Patient presents with    Skin Exam     History of Present Illness:  *retired fall 2020 - helping out with grandchildren - youngest born summer 2020    1. F/u AK's.  Few new spots - L FH, R FH and L nasal tip  No probs with previous sites.  s/p bx of tender irritated lesion on the R neck at last visit  - read as AK.  Healed well and has had no probs since last seen.      2. Here for evaluation of multiple asx pigmented lesions on the trunk and extremities, present for many years; no change in size/shape/color of any lesions; no bleeding lesions.  He has a few scattered red lesions as well.    3. F/u NMSC - SCC in situ - R calf - curettage .  No probs since.    Denies personal history of skin cancer.  No family history of skin cancer.  He avoids the sun, wears sunscreen if outside.  No tanning bed use.  Doesn't tan easily.  Had a lot of sun as a child.    Review of Systems:  Gen: Feels well, good sense of health.  Skin: No changing moles or lesions.      Past Medical History, Family History, Surgical History, Medications and Allergies reviewed.    No outpatient medications have been marked as taking for the 2/6/24 encounter (Office Visit) with Ivett Feliciano MD.     No Known Allergies    Past Medical History:   Diagnosis Date    Hyperlipidemia      Past Surgical History:   Procedure Laterality Date    CYST REMOVAL Right     deltoid       Physical Examination     Gen, well-appearing    FSE today except for underwear-covered areas    neck clear  L FH, R FH and L nasal tip with erythematous roughly scaled macules  trunk and extremities with scattered brown macules and papules   R calf with scar - clear    Assessment and Plan     1. AK - L FH, R FH and L

## 2025-05-01 ENCOUNTER — PATIENT MESSAGE (OUTPATIENT)
Age: 65
End: 2025-05-01

## 2025-08-20 ENCOUNTER — TELEPHONE (OUTPATIENT)
Age: 65
End: 2025-08-20